# Patient Record
Sex: FEMALE | Race: WHITE | Employment: STUDENT | URBAN - METROPOLITAN AREA
[De-identification: names, ages, dates, MRNs, and addresses within clinical notes are randomized per-mention and may not be internally consistent; named-entity substitution may affect disease eponyms.]

---

## 2017-04-17 ENCOUNTER — HOSPITAL ENCOUNTER (EMERGENCY)
Facility: HOSPITAL | Age: 22
Discharge: HOME | End: 2017-04-18
Attending: EMERGENCY MEDICINE
Payer: COMMERCIAL

## 2017-04-17 DIAGNOSIS — M54.50 BACK PAIN, LUMBOSACRAL: Primary | ICD-10-CM

## 2017-04-17 PROCEDURE — 96374 THER/PROPH/DIAG INJ IV PUSH: CPT

## 2017-04-17 PROCEDURE — 99284 EMERGENCY DEPT VISIT MOD MDM: CPT

## 2017-04-17 PROCEDURE — 96376 TX/PRO/DX INJ SAME DRUG ADON: CPT

## 2017-04-17 PROCEDURE — 96375 TX/PRO/DX INJ NEW DRUG ADDON: CPT

## 2017-04-17 PROCEDURE — 81025 URINE PREGNANCY TEST: CPT

## 2017-04-18 VITALS
WEIGHT: 200 LBS | HEART RATE: 49 BPM | RESPIRATION RATE: 16 BRPM | OXYGEN SATURATION: 97 % | DIASTOLIC BLOOD PRESSURE: 69 MMHG | SYSTOLIC BLOOD PRESSURE: 127 MMHG | HEIGHT: 66 IN | BODY MASS INDEX: 32.14 KG/M2

## 2017-04-18 RX ORDER — METHYLPREDNISOLONE 4 MG/1
TABLET ORAL
Qty: 1 PACKAGE | Refills: 0 | Status: SHIPPED | OUTPATIENT
Start: 2017-04-18 | End: 2017-04-23

## 2017-04-18 RX ORDER — ORPHENADRINE CITRATE 30 MG/ML
60 INJECTION INTRAMUSCULAR; INTRAVENOUS ONCE
Status: COMPLETED | OUTPATIENT
Start: 2017-04-18 | End: 2017-04-18

## 2017-04-18 RX ORDER — KETOROLAC TROMETHAMINE 30 MG/ML
30 INJECTION, SOLUTION INTRAMUSCULAR; INTRAVENOUS ONCE
Status: COMPLETED | OUTPATIENT
Start: 2017-04-18 | End: 2017-04-18

## 2017-04-18 RX ORDER — HYDROMORPHONE HYDROCHLORIDE 1 MG/ML
1 INJECTION, SOLUTION INTRAMUSCULAR; INTRAVENOUS; SUBCUTANEOUS EVERY 30 MIN PRN
Status: DISCONTINUED | OUTPATIENT
Start: 2017-04-18 | End: 2017-04-18

## 2017-04-18 RX ORDER — MELOXICAM 10 MG/1
CAPSULE ORAL
COMMUNITY

## 2017-04-18 RX ORDER — TRAMADOL HYDROCHLORIDE 50 MG/1
TABLET ORAL EVERY 6 HOURS PRN
Qty: 20 TABLET | Refills: 0 | Status: SHIPPED | OUTPATIENT
Start: 2017-04-18 | End: 2017-04-25

## 2017-04-18 RX ORDER — TRAMADOL HYDROCHLORIDE 50 MG/1
TABLET ORAL EVERY 6 HOURS PRN
COMMUNITY

## 2017-04-18 RX ORDER — ORPHENADRINE CITRATE 100 MG/1
100 TABLET, EXTENDED RELEASE ORAL 2 TIMES DAILY
Qty: 10 TABLET | Refills: 0 | Status: SHIPPED | OUTPATIENT
Start: 2017-04-18 | End: 2017-04-25

## 2017-04-18 NOTE — ED PROVIDER NOTES
Patient Seen in: [facility] Emergency Department    History   Patient presents with:  Back Pain (musculoskeletal)    Stated Complaint: back pain    HPI    Patient has a history of bulging disks in her back diagnosed on MRI scan.   She is also had a h family history on file. Smoking Status: Never Smoker                        Review of Systems    Positive for stated complaint: back pain  Other systems are as noted in HPI. Constitutional and vital signs reviewed.       All other systems reviewed and much benefit. Patient treated with Toradol, Norflex, and Dilaudid for pain    On repeat examination, she appeared more comfortable. Her father arrived. I reviewed her presentation and treatment.   At this point, I believe she may be safely discharged h

## 2017-04-18 NOTE — ED NOTES
Patient still reports back pain 6/10. ER MD notified. Patient's heart rate 40-50's will hold off on another administration of pain medication.

## 2017-04-18 NOTE — ED INITIAL ASSESSMENT (HPI)
Patient sts lower middle back pain started a few hours ago. Denies taking tylenol or motrin PTA. Denies bowel or bladder incontinence.

## 2018-12-28 PROCEDURE — 88175 CYTOPATH C/V AUTO FLUID REDO: CPT | Performed by: NURSE PRACTITIONER

## 2020-10-19 PROBLEM — E66.01 SEVERE OBESITY (HCC): Status: ACTIVE | Noted: 2020-10-19

## 2021-06-29 ENCOUNTER — HOSPITAL ENCOUNTER (OUTPATIENT)
Dept: PHYSICAL THERAPY | Age: 26
Discharge: HOME | End: 2021-06-29
Payer: COMMERCIAL

## 2021-06-29 PROCEDURE — 97110 THERAPEUTIC EXERCISES: CPT

## 2021-06-29 PROCEDURE — 97162 PT EVAL MOD COMPLEX 30 MIN: CPT

## 2021-06-29 NOTE — THERAPY EVALUATION
Beverley Lebron  : 1995  Primary: [facility]  Secondary:  Therapy Center at . [facility], [address]  [phone]   [phone]         OUTPATIENT PHYSICAL THERAPY:Initial Assessment 2021    Treatment Diagnosis: Low back pain (M54.5)  Lumbago with Sciatica Left side (M54.42)  Muscle Weakness, Generalized (M62.81)  Pain in right knee (M25.561)    Precautions/Allergies:   Patient has no known allergies. MD Orders: Eval and Treat MEDICAL/REFERRING DIAGNOSIS:  Pain in right knee [M25.561]   DATE OF ONSET: chronic with recent flare up in April  REFERRING PHYSICIAN: Noel Nesbitt MD  RETURN PHYSICIAN APPOINTMENT: TBD by patient     INITIAL ASSESSMENT:  Ms. Castelan presents to physical therapy with right knee and low back pain that gradual flared up starting in 2021. Since flare up pt has limited her activity and normal wellness program at [employer]. Pt has a complex medical history as she has juvenile RA, discectomy in 2017, and a more recent R wrist surgery to remove ganglion cyst. Pt vocalizes that she would like to return to her regular community based wellness program, however she is fearful of what and how to exercise safely. Patient will benefit from skilled physical therapy for manual therapeutic techniques (as appropriate), therapeutic exercises and activities, balance and comprehensive home exercises program to address current impairments and functional limitations. Beverley Lebron will benefit from skilled PT (medically necessary) in order to address above deficits affecting participation in basic ADLs and overall functional tolerance. PROBLEM LIST (Impacting functional limitations):   1. Decreased Strength  2. Decreased ADL/Functional Activities  3. Decreased Transfer Abilities  4. Decreased Ambulation Ability/Technique  5. Decreased Balance  6.  Increased Pain  7. Decreased Joint mobility/Flexibility   8. Decreased Activity Tolerance  9. Decreased [place] with Home Exercise Program INTERVENTIONS PLANNED:   1. Balance Exercise  2. Bed Mobility  3. Cold  4. Cryotherapy  5. Family Education  6. Gait Training  7. Heat  8. Home Exercise Program (HEP)  9. Manual Therapy  10. Neuromuscular Re-education/Strengthening  11. Range of Motion (ROM)  12. Therapeutic Activites  13. Therapeutic Exercise/Strengthening  14. Transfer Training  15. Ultrasound (US)  16. Vasopneumatic Compression          TREATMENT PLAN:  Effective Dates: 6/29/2021 TO 8/28/2021 (60 days). Frequency/Duration: 2 times a week for 60 Days    GOALS: (Goals have been discussed and agreed upon with patient.)   Short-Term Goals: 30 days  Goal Met   1Siri Kang will be independent with HEP to maintain functional gains made with therapy intervention. 1.  [] Date:   2. Leonid Kang will be able to complete single leg squat x 1 min with no more than 2 instances of knee valgus to prepare for return to running. 2.  [] Date:   3. Leonid Kang will return to gym to complete machine based exercises at least 2x/ week to assist with safe progression to free weights. 3.  [] Date:   4. Leonid Kang will be able to perform 20 sit to stand transfers in 30 seconds from standard height chair without UE support in order to improve strength and community access at restaurants 4. [] Date:    Long Term Goals: 60 days Goal Met   1. Leonid Kang to increase lower extremity functional scale by 20 points to show improvement in household and community mobility. 1.  [] Date:   2. Leonid Kang will be able to carry 20 lbs in bilateral UE in order to complete household chores, community participation, and work needs. 2.  [] Date:   3.  Leonid Kang will be able to lift 100 lb from floor in order to complete household chores and improve overall strength 3.  [] Date:   4. Lenin Tucker will be able to complete a 10 min walk/jog program to return to pt desire to return to running at gym 4. [] Date:   5. Lenin Tucker will increase  strength to 60lb on the right to indicated improved overall strength 5.  [] Date:        strength L 52 lb, right 50 lb  Outcome Measure: Tool Used: Lower Extremity Functional Scale (LEFS)  Score:  Initial: 50/80 Most Recent: X/80 (Date: -- )   Interpretation of Score: 20 questions each scored on a 5 point scale with 0 representing \"extreme difficulty or unable to perform\" and 4 representing \"no difficulty\". The lower the score, the greater the functional disability. 80/80 represents no disability. Minimal detectable change is 9 points. Tool Used: 30 sec Sit to Stand  Score:  Initial: 12 reps Most Recent: X reps (Date: -- )       Medical Necessity:   · Skilled intervention continues to be required due to current impairment. Reason for Services/Other Comments:  · Patient continues to require skilled intervention due to patient continues to present with impairments assessed at initial evaluation and requiring skilled physical therapy to meet goals for PT. Rehabilitation Potential For Stated Goals: Excellent    Total Treatment Duration: 30 min plus treatment  PT Patient Time In/Time Out  Time In: 0820  Time Out: 1961    Regarding [facility]'s therapy, I certify that the treatment plan above will be carried out by a therapist or under their direction. Thank you for this referral,  Harris Farah PT     Referring Physician Signature: Venita Gosselin, MD              Date                  HISTORY:   History of Injury/Illness (Reason for Referral):  Approximately 2 months ago had gradual onset of knee pain. Pt reports aggravating instance was wearing heels and walking around. Pt reports that she has had chronic LBP.  Pt had surgery in 2017 due to ruptured L4/L5 disc. Pt reports that she use to like running, she has not been running in a long time. Pt reports she is hit and miss with a work out routine and that comes and goes. pt reports poor tolerance to heavy household chores - flares up back pain. Pt reports she has had numbness in her left before her surgery in 2017. 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 Karla Jalloh  has a past medical history of Rheumatoid arthritis ([facility] [address] Ms. Karla Jalloh  has a past surgical history that includes hx back surgery (2017); hx appendectomy; and hx tonsil and adenoidectomy (2006).     Current Medications:    Current Outpatient Medications:     naproxen sodium (Aleve) 220 mg cap, 1 tablet with food or milk as needed, Disp: , Rfl:     adalimumab (Humira,CF, Pen) 40 mg/0.4 mL injection pen, 0.4 ml, Disp: , Rfl:     FLUoxetine (PROzac) 20 mg capsule, , Disp: , Rfl:     hydrOXYchloroQUINE (Plaquenil) 200 mg tablet, 2 tabs, Disp: , Rfl:     leucovorin calcium (WELLCOVORIN) 10 mg tablet, , Disp: , Rfl:     L-Norgest&E Estradiol-E Estrad (Seasonique) 0.15 mg-30 mcg (84)/10 mcg (7) 3MPk, Take 1 Tab by mouth daily. , Disp: 1 Package, Rfl: 3      Ambulatory/Rehab Services H2 Model Falls Risk Assessment    Risk Factors:       (1)  Any administered benzodiazepines Ability to Rise from Chair:       (0)  Ability to rise in a single movement    Falls Prevention Plan:       No modifications necessary   Total: (5 or greater = High Risk): 1    ©2010 [facility] [employer]. All Rights Reserved. [facility] Patent #[id number].  Federal Law prohibits the replication, distribution or use without written permission from [facility] [employer]         Date Last Reviewed:  6/29/2021   Number of Personal Factors/Comorbidities that affect the Plan of Care: 1-2: MODERATE COMPLEXITY   EXAMINATION:   Observation/Orthostatic Postural Assessment:   Gait:  No significant deviations noted, pt guarded with movement    Functional Mobility: Limited tolerance of walking and standing, unable to jog as she was previously able to   Sit to Stand= increased R knee valgus with repitition  Squat= unabel to complete full functional squat to allow ichial tuberosities just below parallel - pt attains 50 % of range, shifts weight to forefoot    Lumbar ROM (Qualitative)    Movement Range Descriptor   Flexion Hands to Shin tightness   Extension Spine of Scapula to midline tightness   Left Rotation Shoulder to midline tightness   Right Rotation Shoulder to midline tightness              AROM/PROM         Joint: Eval Date: 6/29/21  Re-Assess Date:  Re-Assess Date:    Active ROM RIGHT LEFT RIGHT LEFT RIGHT LEFT   Knee Extension 0 0       Knee Flexion 115 115       Hip Flexion 115 115       Hip extension 10 10       Hip IR/ER NT NT         Strength:     Eval Date: 6/29/21  Re-Assess Date:  Re-Assess Date:      RIGHT LEFT RIGHT LEFT RIGHT LEFT   Knee Flexion 4+/5 4+/5       Knee Extension 5/5 5/5       Hip Flexion 5/5 5/5       Hip Abduction 4+/5 4+/5 Hip Extension 4+/5 4+/5       Hip External Rotation 4+/5 4+/5       Dorsiflexion 5/5 5/5           Neurological Screen:  No radiating symptoms currently, however pt reports that she will occasionally get radicular symptoms into the left foot when back flares up      Body Structures Involved:  1. Bones  2. Joints  3. Muscles  4. Ligaments Body Functions Affected:  1. Sensory/Pain  2. Neuromusculoskeletal  3. Movement Related Activities and Participation Affected:  1. Mobility  2. Self Care   Number of elements that affect the Plan of Care: 3: MODERATE COMPLEXITY   CLINICAL PRESENTATION:   Presentation: Evolving clinical presentation with changing clinical characteristics: MODERATE COMPLEXITY   CLINICAL DECISION MAKING:      Use of outcome tool(s) and clinical judgement create a POC that gives a: Questionable prediction of patient's progress: MODERATE COMPLEXITY   See treatment note for associated treatment provided today.

## 2021-06-29 NOTE — PROGRESS NOTES
Carol Hong Olyama  : 1995  Payor: Risa Johnson / Plan: [facility] / Product Type: PPO /  [address] at [address], [place], [address] [place] [address] Road  [phone]   [phone]          OUTPATIENT PHYSICAL THERAPY: Daily Treatment Note 2021 Visit Count:  1    Treatment Diagnosis: Low back pain (M54.5)  Lumbago with Sciatica Left side (M54.42)  Muscle Weakness, Generalized (M62.81)  Pain in right knee (M25.561)    Precautions/Allergies:   Patient has no known allergies. MD Orders: Eval and Treat MEDICAL/REFERRING DIAGNOSIS:  Pain in right knee [M25.561]   DATE OF ONSET: chronic with recent flare up in April  REFERRING PHYSICIAN: Cherelle Gong MD  RETURN PHYSICIAN APPOINTMENT: TBD by patient       Pre-treatment Symptoms/Complaints: See Initial Eval Dated 21 for more details. Pain: Initial:6/10  Medications Last Reviewed:  2021     Post Session: 6/10   Updated Objective Findings: See Initial Eval for more details. TREATMENT:   THERAPEUTIC EXERCISE: (20 minutes):  Exercises per grid below to improve mobility, strength and balance. Required minimal visual, verbal and manual cues to promote proper body alignment and promote proper body posture. Progressed resistance and complexity of movement as indicated. Date:  2021 Date:   Date:     Activity/Exercise Parameters Parameters Parameters   Education HEP, POC, PT goals, anatomy/pathology     Cat/camel 10x     child's pose 2 min     L stretch 3 min     Open book Left/right  10 x     Functional squat 5x                 THERAPEUTIC ACTIVITY: ( 0 minutes): Activities per gid below to improve functional movement related mobility, strength and balance to improve neuro-muscular carryover to daily functional activities for improving patient's quality of life.  Required visual, verbal and manual cues to promote proper body alignment and promote proper body posture/mechanics. Progressed resistance and complexity of movement as indicated. Date:  6/29/2021 Date:   Date:     Activity/Exercise Parameters Parameters Parameters                                                                               MANUAL THERAPY: (0 minutes): Joint mobilization, Soft tissue mobilization was utilized and necessary because of the patient's restricted joint motion and restricted motion of soft tissue mobility. Date  6/29/2021    Technique Used Grade  Level # Time(s) Effect while being performed                                                                 HEP Log Date 1. Open book, cat/camel, kia pose, L stretch, functional squat 6/29/2021   2.  6/29/2021   3. 6/29/2021   4.    5.           [employer] Portal  Treatment/Session Summary:    Response to Treatment: Pt demonstrated understanding of POC and initial HEP. No increase in pain or adverse reactions. Communication/Consultation:  POC, HEP, PT goals, Faxed initial evaluation to MD.   Equipment provided today: HEP Handout   Recommendations/Intent for next treatment session:   Next visit will focus on Core Stability Pain Science Education Quad strengthening Hip strengthening RTC strengthening soft tissue mobilization progressive resistance training. Treatment Plan of Care Effective Dates: 6/29/2021 TO 8/28/2021 (60 days).   Frequency/Duration: 2 times a week for 60 Days       Total Treatment Billable Duration:   20  Rx plus Eval   PT Patient Time In/Time Out  Time In: 0820  Time Out: [address], PT    Future Appointments   Date Time Provider [place]   7/1/2021  7:30 AM Milan Grove, PT [facility]   7/7/2021  7:30 AM [place]   7/9/2021  7:30 AM Milan Grove, PT [facility]   7/14/2021  7:30 AM Milan Grove, PT [facility]   7/16/2021  7:30 AM Milan Grove, PT [facility]

## 2021-07-01 ENCOUNTER — HOSPITAL ENCOUNTER (OUTPATIENT)
Dept: PHYSICAL THERAPY | Age: 26
Discharge: HOME | End: 2021-07-01
Payer: COMMERCIAL

## 2021-07-01 PROCEDURE — 97530 THERAPEUTIC ACTIVITIES: CPT

## 2021-07-01 PROCEDURE — 97110 THERAPEUTIC EXERCISES: CPT

## 2021-07-01 NOTE — PROGRESS NOTES
Dusty Castelan  : 1995  Payor: Cherry Hatchet / Plan: [facility] / Product Type: PPO /  [address] [facility] at [address] [place] [address]. [facility], [address], [facility], [address] [place] [address]  [phone]   [phone]          OUTPATIENT PHYSICAL THERAPY: Daily Treatment Note 2021 Visit Count:  2    Treatment Diagnosis: Low back pain (M54.5)  Lumbago with Sciatica Left side (M54.42)  Muscle Weakness, Generalized (M62.81)  Pain in right knee (M25.561)    Precautions/Allergies:   Patient has no known allergies. MD Orders: Eval and Treat MEDICAL/REFERRING DIAGNOSIS:  Pain in right knee [M25.561]   DATE OF ONSET: chronic with recent flare up in April  REFERRING PHYSICIAN: Jericho Frazier MD  RETURN PHYSICIAN APPOINTMENT: TBD by patient       Pre-treatment Symptoms/Complaints: \"I am feeling a little bit better. \"   Pain: Initial:6/10  Medications Last Reviewed:  2021     Post Session: 6/10   Updated Objective Findings: None today        TREATMENT:   THERAPEUTIC EXERCISE: (25 minutes):  Exercises per grid below to improve mobility, strength and balance. Required minimal visual, verbal and manual cues to promote proper body alignment and promote proper body posture. Progressed resistance and complexity of movement as indicated. Date:  2021 Date:  21 Date:     Activity/Exercise Parameters Parameters Parameters   Education HEP, POC, PT goals, anatomy/pathology     Cat/camel 10x     child's pose  2 min To scorpion through quad rocking  2 min    L stretch 3 min 2 min    Open book Left/right  10 x     Functional squat 5x     Airdyne Bike  6 min   44 calories    LTR  2 min    Scorpion KIck  2 min    Banded face pull aparts  Red band  2 x 8 reps          THERAPEUTIC ACTIVITY: ( 20 minutes):  Activities per gid below to improve functional movement related mobility, strength and balance to improve neuro-muscular carryover to daily functional activities for improving patient's quality of life. Required visual, verbal and manual cues to promote proper body alignment and promote proper body posture/mechanics. Progressed resistance and complexity of movement as indicated. Date:  7/1/2021 Date:   Date:     Activity/Exercise Parameters Parameters Parameters   Sidesteps Red band  2 x 5 fot  Left/right       Monster walks Red band  2 x 50 ft       Box Squat 16 in, red band  15 lb  3 x 8 reps       Dead lift 45 lb  2 x 5 reps                                           MANUAL THERAPY: (0 minutes): Joint mobilization, Soft tissue mobilization was utilized and necessary because of the patient's restricted joint motion and restricted motion of soft tissue mobility. Date  7/1/2021    Technique Used Grade  Level # Time(s) Effect while being performed                                                                 HEP Log Date 1. Open book, cat/camel, kia pose, L stretch, functional squat 6/29/2021   2.  7/1/2021   3. 7/1/2021   4.    5.           [employer] Portal  Treatment/Session Summary:    Response to Treatment: Pt responds well to use of bands for corrective exercises. Pt requires intermittent cues to increase scapular retraction and spinal rigidity. Pt to benefit from continued hip and and scap stabilizer strengthening to improve technique for lifting   Communication/Consultation:  None today   Equipment provided today: None today   Recommendations/Intent for next treatment session:   Next visit will focus on Core Stability Pain Science Education Quad strengthening Hip strengthening RTC strengthening soft tissue mobilization progressive resistance training. Treatment Plan of Care Effective Dates: 6/29/2021 TO 8/28/2021 (60 days).   Frequency/Duration: 2 times a week for 60 Days       Total Treatment Billable Duration:   45  Rx   PT Patient Time In/Time Out  Time In: 0730  Time Out: 0815  Sharri Jackson PT    Future Appointments Date Time Provider [place]   7/7/2021  7:30 AM Carlos Monterroso, PT [facility]   7/9/2021  7:30 AM [facility]   7/14/2021  7:30 AM Carlos Monterroso PT [facility]   7/16/2021  7:30 AM BONNIE Wray [facility]

## 2021-07-07 ENCOUNTER — HOSPITAL ENCOUNTER (OUTPATIENT)
Dept: PHYSICAL THERAPY | Age: 26
Discharge: HOME | End: 2021-07-07
Payer: COMMERCIAL

## 2021-07-07 PROCEDURE — 97530 THERAPEUTIC ACTIVITIES: CPT

## 2021-07-07 PROCEDURE — 97110 THERAPEUTIC EXERCISES: CPT

## 2021-07-07 NOTE — PROGRESS NOTES
Arik Castelan  : 1995  Payor: Enedina Rodriguez / Plan: [facility] / Product Type: PPO /  [address] at [address] [facility], [address], [place], [address] [place] [address] Road  [phone]   [phone]          OUTPATIENT PHYSICAL THERAPY: Daily Treatment Note 2021 Visit Count:  3    Treatment Diagnosis: Low back pain (M54.5)  Lumbago with Sciatica Left side (M54.42)  Muscle Weakness, Generalized (M62.81)  Pain in right knee (M25.561)    Precautions/Allergies:   Patient has no known allergies. MD Orders: Eval and Treat MEDICAL/REFERRING DIAGNOSIS:  Pain in right knee [M25.561]   DATE OF ONSET: chronic with recent flare up in April  REFERRING PHYSICIAN: Noel Nesbitt MD  RETURN PHYSICIAN APPOINTMENT: TBD by patient       Pre-treatment Symptoms/Complaints: \"I am feeling a little bit better. \"   Pain: Initial:6/10  Medications Last Reviewed:  2021     Post Session: 6/10   Updated Objective Findings: None today        TREATMENT:   THERAPEUTIC EXERCISE: ( 8 minutes):  Exercises per grid below to improve mobility, strength and balance. Required minimal visual, verbal and manual cues to promote proper body alignment and promote proper body posture. Progressed resistance and complexity of movement as indicated. Date:  2021 Date:  21 Date:  21   Activity/Exercise Parameters Parameters Parameters   Education HEP, POC, PT goals, anatomy/pathology  Therapist educates on dead lift technique and breathing pattern   Cat/camel 10x     child's pose  2 min To scorpion through quad rocking  2 min    L stretch 3 min 2 min    Open book Left/right  10 x     Functional squat 5x     Airdyne Bike  6 min   44 calories 6 min   44 calories   LTR  2 min    Scorpion KIck  2 min    Banded face pull aparts  Red band  2 x 8 reps          THERAPEUTIC ACTIVITY: ( 32 minutes):  Activities per gid below to improve functional movement related mobility, strength and balance to improve neuro-muscular carryover to daily functional activities for improving patient's quality of life. Required visual, verbal and manual cues to promote proper body alignment and promote proper body posture/mechanics. Progressed resistance and complexity of movement as indicated. Date:  7/1/2021 Date:  7/7/21 Date:     Activity/Exercise Parameters Parameters Parameters   Sidesteps Red band  2 x 5 fot  Left/right Orange band  2 x 15 ft [employer] walks Red band  2 x 50 ft Orange band  2 x 15 ft     Box Squat 16 in, red band  15 lb  3 x 8 reps       Dead lift 45 lb  2 x 5 reps 45 lb x 5 reps  55 lb x 5 rep  65 lb  3 x 5 reps     Palloff Press   Black band  3 x 5 reps  Left/right     Babcock Carry   15 lb  4 laps     Hip Thruster   20 lb  3 x 10 reps           MANUAL THERAPY: (0 minutes): Joint mobilization, Soft tissue mobilization was utilized and necessary because of the patient's restricted joint motion and restricted motion of soft tissue mobility. Date  7/7/2021    Technique Used Grade  Level # Time(s) Effect while being performed                                                                 HEP Log Date 1. Open book, cat/camel, kia pose, L stretch, functional squat 6/29/2021   2.  7/7/2021   3. 7/7/2021   4.    5.           [employer] Portal  Treatment/Session Summary:    Response to Treatment: Pt tanisha to carry over good dead lift technique from previous session. Intermittent cues for bar path as decreased ability to sustain lat control with descent by 2nd and 3rd sets. Pt to go over squat technique next visit. Pt with no reported back and knee pain throughout session.     Communication/Consultation:  None today   Equipment provided today: None today   Recommendations/Intent for next treatment session:   Next visit will focus on Core Stability Pain Science Education Quad strengthening Hip strengthening RTC strengthening soft tissue mobilization progressive resistance training. Treatment Plan of Care Effective Dates: 6/29/2021 TO 8/28/2021 (60 days).   Frequency/Duration: 2 times a week for 60 Days       Total Treatment Billable Duration:   40  Rx , 5 min un-timed due to rest  PT Patient Time In/Time Out  Time In: 0730  Time Out: 0815  Chelo Gonzalez PT    Future Appointments   Date Time Provider [place]   7/9/2021  7:30 AM Darlene Bowers, PT [facility] AND [facility]   7/14/2021  7:30 AM Darlene Bowers, PT [facility]   7/16/2021  7:30 AM Darlene Bowers, PT [facility]

## 2021-07-09 ENCOUNTER — HOSPITAL ENCOUNTER (OUTPATIENT)
Dept: PHYSICAL THERAPY | Age: 26
Discharge: HOME | End: 2021-07-09
Payer: COMMERCIAL

## 2021-07-09 PROCEDURE — 97110 THERAPEUTIC EXERCISES: CPT

## 2021-07-09 PROCEDURE — 97530 THERAPEUTIC ACTIVITIES: CPT

## 2021-07-09 NOTE — PROGRESS NOTES
Macy Castelan  : 1995  Payor: Tonya Calix / Plan: [facility] / Product Type: PPO /  [address] [facility] at [address] [place] [address]. [facility], [address], [facility], [address] [place] [address]  [phone]   [phone]          OUTPATIENT PHYSICAL THERAPY: Daily Treatment Note 2021 Visit Count:  4    Treatment Diagnosis: Low back pain (M54.5)  Lumbago with Sciatica Left side (M54.42)  Muscle Weakness, Generalized (M62.81)  Pain in right knee (M25.561)    Precautions/Allergies:   Patient has no known allergies. MD Orders: Eval and Treat MEDICAL/REFERRING DIAGNOSIS:  Pain in right knee [M25.561]   DATE OF ONSET: chronic with recent flare up in April  REFERRING PHYSICIAN: Paola Rosa MD  RETURN PHYSICIAN APPOINTMENT: TBD by patient       Pre-treatment Symptoms/Complaints: \"I have not had any issues with my knees. \"   Pain: Initial:6/10  Medications Last Reviewed:  2021     Post Session: 6/10   Updated Objective Findings: None today        TREATMENT:   THERAPEUTIC EXERCISE: ( 8 minutes):  Exercises per grid below to improve mobility, strength and balance. Required minimal visual, verbal and manual cues to promote proper body alignment and promote proper body posture. Progressed resistance and complexity of movement as indicated.      Date:  2021 Date:  21 Date:  21 Date:  21   Activity/Exercise Parameters Parameters Parameters    Education HEP, POC, PT goals, anatomy/pathology  Therapist educates on dead lift technique and breathing pattern    Cat/camel 10x      child's pose  2 min To scorpion through quad rocking  2 min     L stretch 3 min 2 min  2 min   Open book Left/right  10 x      Functional squat 5x      Airdyne Bike  6 min   44 calories 6 min   44 calories    LTR  2 min     Scorpion KIck  2 min     Banded face pull aparts  Red band  2 x 8 reps  Red band  2 x 8 reps   Treadmill    Walk/jog 1:1  6 min   90/90 dowel stretch    15 x THERAPEUTIC ACTIVITY: ( 32 minutes): Activities per gid below to improve functional movement related mobility, strength and balance to improve neuro-muscular carryover to daily functional activities for improving patient's quality of life. Required visual, verbal and manual cues to promote proper body alignment and promote proper body posture/mechanics. Progressed resistance and complexity of movement as indicated. Date:  7/1/2021 Date:  7/7/21 Date:  7/9/21   Activity/Exercise Parameters Parameters Parameters   Sidesteps Red band  2 x 5 fot  Left/right Orange band  2 x 15 ft Purple Band  2 x 30 ft   Monster walks Red band  2 x 50 ft Orange band  2 x 15 ft Purple Band  2 x 30 ft   Box Squat 16 in, red band  15 lb  3 x 8 reps       Dead lift 45 lb  2 x 5 reps 45 lb x 5 reps  55 lb x 5 rep  65 lb  3 x 5 reps     Palloff Press   Black band  3 x 5 reps  Left/right     Babcock Carry   15 lb  4 laps 15lb/20 lb  4 laps   Hip Thruster   20 lb  3 x 10 reps     Low bar squat    45 lb x 8 reps  55 lb x 5 reps  65 lb  3 x 5 reps   Front plank    2 x 30 sec   Side plank    Left/right  2 x 30 sec each         MANUAL THERAPY: (0 minutes): Joint mobilization, Soft tissue mobilization was utilized and necessary because of the patient's restricted joint motion and restricted motion of soft tissue mobility. Date  7/9/2021    Technique Used Grade  Level # Time(s) Effect while being performed                                                                 HEP Log Date 1. Open book, cat/camel, kia pose, L stretch, functional squat 6/29/2021   2.  7/9/2021   3. 7/9/2021   4.    5.           [facility] Portal  Treatment/Session Summary:    Response to Treatment: Pt with good squat depth and requires intermittent cues for maintaining bar position and scapular positioning. Pt initiated on treadmill walk/jog with no knee pain noted.  Pt to benefit for asymmetrical weights and single leg strengthening to prepare for pt desire to run for prolonged amount of time    Communication/Consultation:  None today   Equipment provided today: None today   Recommendations/Intent for next treatment session:   Next visit will focus on Core Stability Pain Science Education Quad strengthening Hip strengthening RTC strengthening soft tissue mobilization progressive resistance training. Treatment Plan of Care Effective Dates: 6/29/2021 TO 8/28/2021 (60 days).   Frequency/Duration: 2 times a week for 60 Days       Total Treatment Billable Duration:   40  Rx , 5 min un-timed due to rest  PT Patient Time In/Time Out  Time In: 3048  Time Out: 0820  Sinai Moore, PT    Future Appointments   Date Time Provider [place]   7/14/2021  7:30 AM Edwin Cameron, PT [facility]   7/16/2021  7[facility]

## 2021-07-14 ENCOUNTER — HOSPITAL ENCOUNTER (OUTPATIENT)
Dept: PHYSICAL THERAPY | Age: 26
Discharge: HOME | End: 2021-07-14
Payer: COMMERCIAL

## 2021-07-14 PROCEDURE — 97110 THERAPEUTIC EXERCISES: CPT

## 2021-07-14 PROCEDURE — 97530 THERAPEUTIC ACTIVITIES: CPT

## 2021-07-14 NOTE — PROGRESS NOTES
Silvia Castelan  : 1995  Payor: Marsha Mendosa / Plan: [facility] / Product Type: PPO /  [address] [facility] at [address] [place] [address]. [facility], [address], [facility], [id number] [place] [address]  [phone]   [phone]          OUTPATIENT PHYSICAL THERAPY: Daily Treatment Note 2021 Visit Count:  5    Treatment Diagnosis: Low back pain (M54.5)  Lumbago with Sciatica Left side (M54.42)  Muscle Weakness, Generalized (M62.81)  Pain in right knee (M25.561)    Precautions/Allergies:   Patient has no known allergies. MD Orders: Eval and Treat MEDICAL/REFERRING DIAGNOSIS:  Pain in right knee [M25.561]   DATE OF ONSET: chronic with recent flare up in April  REFERRING PHYSICIAN: Venita Gosselin, MD  RETURN PHYSICIAN APPOINTMENT: TBD by patient       Pre-treatment Symptoms/Complaints: \"I did okay last time with the walk jog, no issues. \"   Pain: Initial:6/10  Medications Last Reviewed:  2021     Post Session: 6/10   Updated Objective Findings: None today        TREATMENT:   THERAPEUTIC EXERCISE: ( 8 minutes):  Exercises per grid below to improve mobility, strength and balance. Required minimal visual, verbal and manual cues to promote proper body alignment and promote proper body posture. Progressed resistance and complexity of movement as indicated.      Date:  2021 Date:  21 Date:  21 Date:  21 Date:  21   Activity/Exercise Parameters Parameters Parameters     Education HEP, POC, PT goals, anatomy/pathology  Therapist educates on dead lift technique and breathing pattern     Cat/camel 10x       child's pose  2 min To scorpion through quad rocking  2 min      L stretch 3 min 2 min  2 min    Open book Left/right  10 x       Functional squat 5x       Airdyne Bike  6 min   44 calories 6 min   44 calories     LTR  2 min      Scorpion KIck  2 min      Banded face pull aparts  Red band  2 x 8 reps  Red band  2 x 8 reps Red band  2 x 8 reps   Treadmill Walk/jog 1:1  6 min Walk/jog 1:1  6 min   90/90 dowel stretch    15 x          THERAPEUTIC ACTIVITY: ( 32 minutes): Activities per gid below to improve functional movement related mobility, strength and balance to improve neuro-muscular carryover to daily functional activities for improving patient's quality of life. Required visual, verbal and manual cues to promote proper body alignment and promote proper body posture/mechanics. Progressed resistance and complexity of movement as indicated. Date:  7/1/2021 Date:  7/7/21 Date:  7/9/21 Date:  7/14/21   Activity/Exercise Parameters Parameters Parameters    Sidesteps Red band  2 x 5 fot  Left/right Orange band  2 x 15 ft Purple Band  2 x 30 ft Purple Band  2 x 30 ft   Monster walks Red band  2 x 50 ft Orange band  2 x 15 ft Purple Band  2 x 30 ft Purple Band  2 x 30 ft   Box Squat 16 in, red band  15 lb  3 x 8 reps         Dead lift 45 lb  2 x 5 reps 45 lb x 5 reps  55 lb x 5 rep  65 lb  3 x 5 reps   45 lb x 8 reps  70 lb x 5 reps  75 lb  3 x 5 reps   Palloff Press   Black band  3 x 5 reps  Left/right       Babcock Carry   15 lb  4 laps 15lb/20 lb  4 laps 20 lb  4 laps   Hip Thruster   20 lb  3 x 10 reps       Low bar squat    45 lb x 8 reps  55 lb x 5 reps  65 lb  3 x 5 reps    Front plank    2 x 30 sec    Side plank    Left/right  2 x 30 sec each    [place] split squat     3 x 8 reps  Left/right   Good Mornings     30 lb   3 x 8 reps         MANUAL THERAPY: (0 minutes): Joint mobilization, Soft tissue mobilization was utilized and necessary because of the patient's restricted joint motion and restricted motion of soft tissue mobility. Date  7/14/2021    Technique Used Grade  Level # Time(s) Effect while being performed                                                                 HEP Log Date 1.    Open book, cat/camel, kia pose, L stretch, functional squat 6/29/2021   2.  7/14/2021   3. 7/14/2021   4.    5.           [employer] Portal  Treatment/Session Summary:    Response to Treatment: Pt to benefit from continued progression to single leg exercises. Pt with improved foot contact and control in LE chain with shoes doffed. Pt demos carry over of dead lift technique and a vertical bar path. Communication/Consultation:  None today   Equipment provided today: None today   Recommendations/Intent for next treatment session:   Next visit will focus on Core Stability Pain Science Education Quad strengthening Hip strengthening RTC strengthening soft tissue mobilization progressive resistance training. Treatment Plan of Care Effective Dates: 6/29/2021 TO 8/28/2021 (60 days).   Frequency/Duration: 2 times a week for 60 Days       Total Treatment Billable Duration:   40  Rx , 5 min un-timed due to rest  PT Patient Time In/Time Out  Time In: 0730  Time Out: 0815  Katharine Obrien PT    Future Appointments   Date Time Provider [place]   7/16/2021  7:30 AM Joann Whitmore PT [facility] AND [facility]

## 2021-07-16 ENCOUNTER — APPOINTMENT (OUTPATIENT)
Dept: PHYSICAL THERAPY | Age: 26
End: 2021-07-16
Payer: COMMERCIAL

## 2021-07-21 ENCOUNTER — APPOINTMENT (OUTPATIENT)
Dept: PHYSICAL THERAPY | Age: 26
End: 2021-07-21
Payer: COMMERCIAL

## 2021-07-23 ENCOUNTER — APPOINTMENT (OUTPATIENT)
Dept: PHYSICAL THERAPY | Age: 26
End: 2021-07-23
Payer: COMMERCIAL

## 2021-07-28 ENCOUNTER — HOSPITAL ENCOUNTER (OUTPATIENT)
Dept: PHYSICAL THERAPY | Age: 26
Discharge: HOME | End: 2021-07-28
Payer: COMMERCIAL

## 2021-07-28 PROCEDURE — 97530 THERAPEUTIC ACTIVITIES: CPT

## 2021-07-28 PROCEDURE — 97110 THERAPEUTIC EXERCISES: CPT

## 2021-07-28 NOTE — PROGRESS NOTES
Loy Castelan  : 1995  Payor: Iam Vazquez / Plan: [facility] / Product Type: PPO /  [address] [facility] at [address] [place] [address]. [facility], [address], [facility], [address] [place] [address]  [phone]   [phone]          OUTPATIENT PHYSICAL THERAPY: Daily Treatment Note 2021 Visit Count:  6    Treatment Diagnosis: Low back pain (M54.5)  Lumbago with Sciatica Left side (M54.42)  Muscle Weakness, Generalized (M62.81)  Pain in right knee (M25.561)    Precautions/Allergies:   Patient has no known allergies. MD Orders: Eval and Treat MEDICAL/REFERRING DIAGNOSIS:  Pain in right knee [M25.561]   DATE OF ONSET: chronic with recent flare up in April  REFERRING PHYSICIAN: Stalin Valles MD  RETURN PHYSICIAN APPOINTMENT: TBD by patient       Pre-treatment Symptoms/Complaints: No issues with nerve pain going down leg since started therapy. Pain: Initial:6/10  Medications Last Reviewed:  2021     Post Session: 6/10   Updated Objective Findings: None today        TREATMENT:   THERAPEUTIC EXERCISE: ( 12 minutes):  Exercises per grid below to improve mobility, strength and balance. Required minimal visual, verbal and manual cues to promote proper body alignment and promote proper body posture. Progressed resistance and complexity of movement as indicated.      Date:  21 Date:  21 Date:  21 Date:  21 Date  2021   Activity/Exercise Parameters Parameters      Education  Therapist educates on dead lift technique and breathing pattern      Cat/camel        child's pose  To scorpion through quad rocking  2 min       L stretch 2 min  2 min     Open book        Functional squat        Airdyne Bike 6 min   44 calories 6 min   44 calories      LTR 2 min       Scorpion KIck 2 min       Banded face pull aparts Red band  2 x 8 reps  Red band  2 x 8 reps Red band  2 x 8 reps    Treadmill   Walk/jog 1:1  6 min Walk/jog 1:1  6 min 12 min 2:2   90/90 [facility] stretch   15 x           THERAPEUTIC ACTIVITY: ( 42 minutes): Activities per gid below to improve functional movement related mobility, strength and balance to improve neuro-muscular carryover to daily functional activities for improving patient's quality of life. Required visual, verbal and manual cues to promote proper body alignment and promote proper body posture/mechanics. Progressed resistance and complexity of movement as indicated. Date:  7/1/2021 Date:  7/7/21 Date:  7/9/21 Date:  7/14/21 Date  7/28/2021   Activity/Exercise Parameters Parameters Parameters     Sidesteps Red band  2 x 5 fot  Left/right Orange band  2 x 15 ft Purple Band  2 x 30 ft Purple Band  2 x 30 ft Purple Band  2 x 30 ft   Monster walks Red band  2 x 50 ft Orange band  2 x 15 ft Purple Band  2 x 30 ft Purple Band  2 x 30 ft Purple Band  2 x 30 ft   Box Squat 16 in, red band  15 lb  3 x 8 reps           Dead lift 45 lb  2 x 5 reps 45 lb x 5 reps  55 lb x 5 rep  65 lb  3 x 5 reps   45 lb x 8 reps  70 lb x 5 reps  75 lb  3 x 5 reps 75lbs x5  80lbs x5  85lbs x5   Palloff Press   Black band  3 x 5 reps  Left/right         Babcock Carry   15 lb  4 laps 15lb/20 lb  4 laps 20 lb  4 laps 20 lb  4 laps   Hip Thruster   20 lb  3 x 10 reps         Low bar squat    45 lb x 8 reps  55 lb x 5 reps  65 lb  3 x 5 reps     Front plank    2 x 30 sec     Side plank    Left/right  2 x 30 sec each     [place] split squat     3 x 8 reps  Left/right    Good Mornings     30 lb   3 x 8 reps 30 lb   3 x 8 reps   Circuit Training      2 rounds  5 back squats  15 cals  10 Goblet squats  3 inchworms                               MANUAL THERAPY: (0 minutes): Joint mobilization, Soft tissue mobilization was utilized and necessary because of the patient's restricted joint motion and restricted motion of soft tissue mobility.         Date  7/28/2021    Technique Used Grade Level # Time(s) Effect while being performed HEP Log Date 1. Open book, cat/camel, kia pose, L stretch, functional squat 6/29/2021   2.  7/28/2021   3. 7/28/2021   4.    5.           [employer] Portal  Treatment/Session Summary:      Response to Treatment:  Pt tolerated treatment well and had no subjective complaints    Communication/Consultation:  None today   Equipment provided today: None today   Recommendations/Intent for next treatment session:   Next visit will focus on Core Stability Pain Science Education Quad strengthening Hip strengthening RTC strengthening soft tissue mobilization progressive resistance training. Treatment Plan of Care Effective Dates: 6/29/2021 TO 8/28/2021 (60 days).   Frequency/Duration: 2 times a week for 60 Days       Total Treatment Billable Duration:   54  Rx ,  PT Patient Time In/Time Out  Time In: 0730  Time Out: [address] [facility] Chan Julian, PT    Future Appointments   Date Time Provider [place]   7/30/2021  7:30 AM Keisha Sep, PT [facility] AND [facility]   8/3/2021  7:30 AM Serrano Estimable, PT [facility]   8/5/2021  7:30 AM Serrano Estimable, PT [facility]

## 2021-07-30 ENCOUNTER — HOSPITAL ENCOUNTER (OUTPATIENT)
Dept: PHYSICAL THERAPY | Age: 26
Discharge: HOME | End: 2021-07-30
Payer: COMMERCIAL

## 2021-07-30 PROCEDURE — 97110 THERAPEUTIC EXERCISES: CPT

## 2021-07-30 PROCEDURE — 97530 THERAPEUTIC ACTIVITIES: CPT

## 2021-07-30 NOTE — PROGRESS NOTES
Arik Castelan  : 1995  Payor: Enedina Rodriguez / Plan: [facility] / Product Type: PPO /  [address] [facility], [address], [place], [address] [place] [address] Road  [phone]   [phone]          OUTPATIENT PHYSICAL THERAPY: Daily Treatment Note 2021 Visit Count:  7    Treatment Diagnosis: Low back pain (M54.5)  Lumbago with Sciatica Left side (M54.42)  Muscle Weakness, Generalized (M62.81)  Pain in right knee (M25.561)    Precautions/Allergies:   Patient has no known allergies. MD Orders: Eval and Treat MEDICAL/REFERRING DIAGNOSIS:  Pain in right knee [M25.561]   DATE OF ONSET: chronic with recent flare up in April  REFERRING PHYSICIAN: Noel Nesbitt MD  RETURN PHYSICIAN APPOINTMENT: TBD by patient       Pre-treatment Symptoms/Complaints: Pt reports that she is doing well and has no complaints    Pain: Initial:6/10  Medications Last Reviewed:  2021     Post Session: 0/10   Updated Objective Findings: None today        TREATMENT:   THERAPEUTIC EXERCISE: ( 10 minutes):  Exercises per grid below to improve mobility, strength and balance. Required minimal visual, verbal and manual cues to promote proper body alignment and promote proper body posture. Progressed resistance and complexity of movement as indicated. Date:  21 Date:  21 Date:  21 Date  2021 Date  2021   Activity/Exercise Parameters       Education Therapist educates on dead lift technique and breathing pattern       Cat/camel        child's pose         L stretch  2 min      Open book        Functional squat        Airdyne Bike 6 min   44 calories       LTR        Scorpion KIck        Banded face pull aparts  Red band  2 x 8 reps Red band  2 x 8 reps     Treadmill  Walk/jog 1:1  6 min Walk/jog 1:1  6 min 12 min 2:2 10 min 2-3   90/90 dowel stretch  15 x            THERAPEUTIC ACTIVITY: ( 35 minutes):  Activities per gid below to improve functional movement related mobility, strength and balance to improve neuro-muscular carryover to daily functional activities for improving patient's quality of life. Required visual, verbal and manual cues to promote proper body alignment and promote proper body posture/mechanics. Progressed resistance and complexity of movement as indicated. Date:  7/7/21 Date:  7/9/21 Date:  7/14/21 Date  7/28/2021 Date  7/30/2021   Activity/Exercise Parameters Parameters      Sidesteps Orange band  2 x 15 ft Purple Band  2 x 30 ft Purple Band  2 x 30 ft Purple Band  2 x 30 ft Purple Band  2 x 30 ft   Monster walks Orange band  2 x 15 ft Purple Band  2 x 30 ft Purple Band  2 x 30 ft Purple Band  2 x 30 ft Purple Band  2 x 30 ft   Box Squat             Dead lift 45 lb x 5 reps  55 lb x 5 rep  65 lb  3 x 5 reps   45 lb x 8 reps  70 lb x 5 reps  75 lb  3 x 5 reps 75lbs x5  80lbs x5  85lbs x5 95 lbs 3x5   Palloff Press Black band  3 x 5 reps  Left/right       Green band 30xs each time   [employer] 15 lb  4 laps 15lb/20 lb  4 laps 20 lb  4 laps 20 lb  4 laps 20 lb  4 laps   Hip Thruster 20 lb  3 x 10 reps           Low bar squat  45 lb x 8 reps  55 lb x 5 reps  65 lb  3 x 5 reps   75 lbs 3x5   Front plank  2 x 30 sec      Side plank  Left/right  2 x 30 sec each      L[employer] split squat   3 x 8 reps  Left/right     Good Mornings   30 lb   3 x 8 reps 30 lb   3 x 8 reps 30 lb 3 x 8 reps   Circuit Training    2 rounds  5 back squats  15 cals  10 Goblet squats  3 inchworms     3 rounds  15 cals  10 thrusters  15 Goblet squats  Time: 16:38                         MANUAL THERAPY: (0 minutes): Joint mobilization, Soft tissue mobilization was utilized and necessary because of the patient's restricted joint motion and restricted motion of soft tissue mobility.         Date  7/30/2021    Technique Used Grade Level # Time(s) Effect while being performed                                                                 HEP Log Date 1. Open book, cat/camel, kia pose, L stretch, functional squat 6/29/2021   2.  7/30/2021   3. 7/30/2021   4.    5.           [employer] Portal  Treatment/Session Summary:      Response to Treatment:  Pt tolerated treatment well and no pain with exercises increased loading to knee and back. Patient was able to tolerate progressions to weight training and HIIT programming. Communication/Consultation:  None today   Equipment provided today: None today   Recommendations/Intent for next treatment session:   Next visit will focus on Core Stability Pain Science Education Quad strengthening Hip strengthening RTC strengthening soft tissue mobilization progressive resistance training. Treatment Plan of Care Effective Dates: 6/29/2021 TO 8/28/2021 (60 days).   Frequency/Duration: 2 times a week for 60 Days       Total Treatment Billable Duration:   45  Rx ,  PT Patient Time In/Time Out  Time In: 0730  Time Out: [address] [facility] [place] PT    Future Appointments   Date Time Provider [place]   8/3/2021  7:30 AM Ursula Samson, PT [facility]   8/5/2021  7:30 AM [facility]ula Samson, PT [facility]

## 2021-08-03 ENCOUNTER — HOSPITAL ENCOUNTER (OUTPATIENT)
Dept: PHYSICAL THERAPY | Age: 26
Discharge: HOME | End: 2021-08-03
Payer: COMMERCIAL

## 2021-08-03 PROCEDURE — 97110 THERAPEUTIC EXERCISES: CPT

## 2021-08-03 PROCEDURE — 97530 THERAPEUTIC ACTIVITIES: CPT

## 2021-08-03 NOTE — PROGRESS NOTES
Jet Castelan  : 1995  Payor: Sumaya Fiore / Plan: [facility] / Product Type: PPO /  Javier [address]ler at [address] [facility] [address], [facility], [address] [place] [address]  [phone]   [phone]          OUTPATIENT PHYSICAL THERAPY: Daily Treatment Note 8/3/2021 Visit Count:  8    Treatment Diagnosis: Low back pain (M54.5)  Lumbago with Sciatica Left side (M54.42)  Muscle Weakness, Generalized (M62.81)  Pain in right knee (M25.561)    Precautions/Allergies:   Patient has no known allergies. MD Orders: Eval and Treat MEDICAL/REFERRING DIAGNOSIS:  Pain in right knee [M25.561]   DATE OF ONSET: chronic with recent flare up in April  REFERRING PHYSICIAN: Edgar Pollack MD  RETURN PHYSICIAN APPOINTMENT: TBD by patient       Pre-treatment Symptoms/Complaints: Pt reporting no issues with back and knee pain. Pain: Initial:6/10  Medications Last Reviewed:  8/3/2021     Post Session: 0/10   Updated Objective Findings: None today        TREATMENT:   THERAPEUTIC EXERCISE: ( 12 minutes):  Exercises per grid below to improve mobility, strength and balance. Required minimal visual, verbal and manual cues to promote proper body alignment and promote proper body posture. Progressed resistance and complexity of movement as indicated.      Date:  21 Date:  21 Date:  21 Date  2021 Date  2021 Date:  8/3/21   Activity/Exercise Parameters        Education Therapist educates on dead lift technique and breathing pattern        Cat/camel         child's pose          L stretch  2 min       Open book         Functional squat         Airdyne Bike 6 min   44 calories        LTR         Scorpion KIck         Banded face pull aparts  Red band  2 x 8 reps Red band  2 x 8 reps      Treadmill  Walk/jog 1:1  6 min Walk/jog 1:1  6 min 12 min 2:2 10 min 2-3 Rolling hill function, walk/jog x 12 min   90/90 dowel stretch  15 x             THERAPEUTIC ACTIVITY: ( 33 minutes): Activities per gid below to improve functional movement related mobility, strength and balance to improve neuro-muscular carryover to daily functional activities for improving patient's quality of life. Required visual, verbal and manual cues to promote proper body alignment and promote proper body posture/mechanics. Progressed resistance and complexity of movement as indicated.      Date:  7/7/21 Date:  7/9/21 Date:  7/14/21 Date  7/28/2021 Date  7/30/2021 Date:  8/3/21   Activity/Exercise Parameters Parameters       Sidesteps Orange band  2 x 15 ft Purple Band  2 x 30 ft Purple Band  2 x 30 ft Purple Band  2 x 30 ft Purple Band  2 x 30 ft Purple Band  2 x 30 ft   Monster walks Orange band  2 x 15 ft Purple Band  2 x 30 ft Purple Band  2 x 30 ft Purple Band  2 x 30 ft Purple Band  2 x 30 ft Purple Band  2 x 30 ft   Box Squat               Dead lift 45 lb x 5 reps  55 lb x 5 rep  65 lb  3 x 5 reps   45 lb x 8 reps  70 lb x 5 reps  75 lb  3 x 5 reps 75lbs x5  80lbs x5  85lbs x5 95 lbs 3x5 70 lb x 8 reps  90 lb x 5 reps  100 lb 3 x 5 reps   Palloff Press Black band  3 x 5 reps  Left/right       Green band 30xs each time    [employer] 15 lb  4 laps 15lb/20 lb  4 laps 20 lb  4 laps 20 lb  4 laps 20 lb  4 laps    Hip Thruster 20 lb  3 x 10 reps             Low bar squat  45 lb x 8 reps  55 lb x 5 reps  65 lb  3 x 5 reps   75 lbs 3x5 65 lb x 8 reps  70 lb x 5 rep  80 lb 3 x 5 reps   Front plank  2 x 30 sec       Side plank  Left/right  2 x 30 sec each       [employer] split squat   3 x 8 reps  Left/right      Good Mornings   30 lb   3 x 8 reps 30 lb   3 x 8 reps 30 lb 3 x 8 reps    Circuit Training    2 rounds  5 back squats  15 cals  10 Goblet squats  3 inchworms     3 rounds  15 cals  10 thrusters  15 Goblet squats  Time: 16:38                            MANUAL THERAPY: (0 minutes): Joint mobilization, Soft tissue mobilization was utilized and necessary because of the patient's restricted joint motion and restricted motion of soft tissue mobility. Date  8/3/2021    Technique Used Grade Level # Time(s) Effect while being performed                                                                 HEP Log Date 1. Open book, cat/camel, kia pose, L stretch, functional squat 6/29/2021   2.  8/3/2021   3. 8/3/2021   4.    5.           [employer] Portal  Treatment/Session Summary:      Response to Treatment:  pt with no knee and low back pain with progression to rolling hills function on treadmill. Pt continues to show good carry over of lifting technique  For squat and dead lift. pt anticipated to d/c next visit. Communication/Consultation:  None today   Equipment provided today: None today   Recommendations/Intent for next treatment session:   Next visit will focus on Core Stability Pain Science Education Quad strengthening Hip strengthening RTC strengthening soft tissue mobilization progressive resistance training. Treatment Plan of Care Effective Dates: 6/29/2021 TO 8/28/2021 (60 days).   Frequency/Duration: 2 times a week for 60 Days       Total Treatment Billable Duration:   45  Rx   PT Patient Time In/Time Out  Time In: 0730  Time Out: 0815  Phoebe Recio, PT    Future Appointments   Date Time Provider [place]   8/5/2021  7:30 AM Pinky Archibald, PT [facility]

## 2021-08-05 ENCOUNTER — HOSPITAL ENCOUNTER (OUTPATIENT)
Dept: PHYSICAL THERAPY | Age: 26
Discharge: HOME | End: 2021-08-05
Payer: COMMERCIAL

## 2021-08-05 PROCEDURE — 97110 THERAPEUTIC EXERCISES: CPT

## 2021-08-05 PROCEDURE — 97530 THERAPEUTIC ACTIVITIES: CPT

## 2021-08-05 NOTE — THERAPY DISCHARGE
Jessica Muhammad  : 1995  Primary: [facility]*  Secondary:  [facility] at [facility] [address]  [address], [address]  [phone]   [phone]         OUTPATIENT PHYSICAL THERAPY:Discharge 2021    Treatment Diagnosis: Low back pain (M54.5)  Lumbago with Sciatica Left side (M54.42)  Muscle Weakness, Generalized (M62.81)  Pain in right knee (M25.561)    Precautions/Allergies:   Patient has no known allergies. MD Orders: Eval and Treat MEDICAL/REFERRING DIAGNOSIS:  Pain in right knee [M25.561]   DATE OF ONSET: chronic with recent flare up in April  REFERRING PHYSICIAN: Pola Pacheco MD  RETURN PHYSICIAN APPOINTMENT: TBD by patient     DISCHARGE ASSESSMENT:  Ms. Lopez Console has met 4/4 STGs and 5/5 LTGs over course of therapy intervention focused on improving overall strength, developing plan for safe return to the gym, return to running, and pain science education. Pt reports that she has not had any back pain or knee pain over the past few weeks. Pt demos appropriate technique lifting mechanics and is participating in high intensity training. Pt is able to return community wellness program. Pt no longer requires skilled therapy intervention. PT POC closed. TREATMENT PLAN:  Effective Dates: 2021 TO 2021 (60 days). Frequency/Duration: 2 times a week for 60 Days    GOALS: (Goals have been discussed and agreed upon with patient.)   Short-Term Goals: 30 days  Goal Met   1. Jessica Muhammad will be independent with HEP to maintain functional gains made with therapy intervention. 1.  [x] Date: 21   2. Jessica Muhammad will be able to complete single leg squat x 1 min with no more than 2 instances of knee valgus to prepare for return to running. 2.  [x] Date: 21   3.  Jessica Muhammad will return to gym to complete machine based exercises at least 2x/ week to assist with safe progression to free weights. 3.  [x] Date: 8/5/21   4. Wan Baker will be able to perform 20 sit to stand transfers in 30 seconds from standard height chair without UE support in order to improve strength and community access at restaurants 4. [x] Date: 8/5/21    Long Term Goals: 60 days Goal Met   1. Wan Baker to increase lower extremity functional scale by 20 points to show improvement in household and community mobility. 1.  [x] Date: 8/5/21   2. Wan Baker will be able to carry 20 lbs in bilateral UE in order to complete household chores, community participation, and work needs. 2.  [x] Date: 8/5/21   3. Wan Baker will be able to lift 100 lb from floor in order to complete household chores and improve overall strength 3. [x] Date: 8/3/21   4. Wan Baker will be able to complete a 10 min walk/jog program to return to pt desire to return to running at gym 4. [x] Date: 8/5/21   5. Wan Baker will increase  strength to 60lb on the right to indicated improved overall strength 5. [x] Date: 8/5/21         Outcome Measure: Tool Used: Lower Extremity Functional Scale (LEFS)  Score:  Initial: 50/80 Most Recent: 73/80 (Date: 8/5/21 )   Interpretation of Score: 20 questions each scored on a 5 point scale with 0 representing \"extreme difficulty or unable to perform\" and 4 representing \"no difficulty\". The lower the score, the greater the functional disability. 80/80 represents no disability. Minimal detectable change is 9 points.     Tool Used: 30 sec Sit to Stand  Score:  Initial: 12 reps Most Recent: 28 reps (Date: 8/5/21)      strength L 52 lb, right 50 lb initial, 8/5/21  strength: Left: 64 lb Right 65 lb      Total Treatment Duration: 40 min see treatment note  PT Patient Time In/Time Out  Time In: 0735  Time Out: 0815    Regarding Tomasz Louis's therapy, I certify that the treatment plan above will be carried out by a therapist or under their direction.   Thank you for this referral,  Celso Coker PT     Referring Physician Signature: Sridhar Arteaga MD

## 2021-08-05 NOTE — PROGRESS NOTES
Lina Castelan  : 1995  Payor: Migel Saldana / Plan: [facility] / Product Type: PPO /  [address] [facility], [address], [facility], [address] [place] [address] Road  [phone]   [phone]          OUTPATIENT PHYSICAL THERAPY: Daily Treatment Note 2021 Visit Count:  9    Treatment Diagnosis: Low back pain (M54.5)  Lumbago with Sciatica Left side (M54.42)  Muscle Weakness, Generalized (M62.81)  Pain in right knee (M25.561)    Precautions/Allergies:   Patient has no known allergies. MD Orders: Eval and Treat MEDICAL/REFERRING DIAGNOSIS:  Pain in right knee [M25.561]   DATE OF ONSET: chronic with recent flare up in April  REFERRING PHYSICIAN: Catie Llamas MD  RETURN PHYSICIAN APPOINTMENT: TBD by patient       Pre-treatment Symptoms/Complaints: Pt reporting no issues with back and knee pain. Pain: Initial:6/10  Medications Last Reviewed:  2021     Post Session: 0/10   Updated Objective Findings: See d/c summary        TREATMENT:   THERAPEUTIC EXERCISE: ( 15 minutes):  Exercises per grid below to improve mobility, strength and balance. Required minimal visual, verbal and manual cues to promote proper body alignment and promote proper body posture. Progressed resistance and complexity of movement as indicated.      Date:  21 Date:  21 Date:  21 Date  2021 Date  2021 Date:  8/3/21 Date:  21   Activity/Exercise Parameters         Education Therapist educates on dead lift technique and breathing pattern      Assessment of goals, LEFS, 30 sec sit to stand,  strength, see d/c summary for details   L stretch  2 min        Airdyne Bike 6 min   44 calories         Banded face pull aparts  Red band  2 x 8 reps Red band  2 x 8 reps       Treadmill  Walk/jog 1:1  6 min Walk/jog 1:1  6 min 12 min 2:2 10 min 2-3 Rolling hill function, walk/jog x 12 min Rolling hill function, walk/jog x 8 min   90/90 [facility] stretch  15 x              THERAPEUTIC ACTIVITY: ( 25 minutes): Activities per gid below to improve functional movement related mobility, strength and balance to improve neuro-muscular carryover to daily functional activities for improving patient's quality of life. Required visual, verbal and manual cues to promote proper body alignment and promote proper body posture/mechanics. Progressed resistance and complexity of movement as indicated. Date:  7/7/21 Date:  7/9/21 Date:  7/14/21 Date  7/28/2021 Date  7/30/2021 Date:  8/3/21 Date:  8/5/21   Activity/Exercise Parameters Parameters        Sidesteps Orange band  2 x 15 ft Purple Band  2 x 30 ft Purple Band  2 x 30 ft Purple Band  2 x 30 ft Purple Band  2 x 30 ft Purple Band  2 x 30 ft Purple Band  2 x 30 ft   Monster walks Orange band  2 x 15 ft Purple Band  2 x 30 ft Purple Band  2 x 30 ft Purple Band  2 x 30 ft Purple Band  2 x 30 ft Purple Band  2 x 30 ft Purple Band  2 x 30 ft   Box Squat                 Dead lift 45 lb x 5 reps  55 lb x 5 rep  65 lb  3 x 5 reps   45 lb x 8 reps  70 lb x 5 reps  75 lb  3 x 5 reps 75lbs x5  80lbs x5  85lbs x5 95 lbs 3x5 70 lb x 8 reps  90 lb x 5 reps  100 lb 3 x 5 reps    Palloff Press Black band  3 x 5 reps  Left/right       Green band 30xs each time     [employer] 15 lb  4 laps 15lb/20 lb  4 laps 20 lb  4 laps 20 lb  4 laps 20 lb  4 laps     Hip Thruster 20 lb  3 x 10 reps               Low bar squat  45 lb x 8 reps  55 lb x 5 reps  65 lb  3 x 5 reps   75 lbs 3x5 65 lb x 8 reps  70 lb x 5 rep  80 lb 3 x 5 reps    Front plank  2 x 30 sec        Side plank  Left/right  2 x 30 sec each        [employer] split squat   3 x 8 reps  Left/right       Good Mornings   30 lb   3 x 8 reps 30 lb   3 x 8 reps 30 lb 3 x 8 reps     Circuit Training    2 rounds  5 back squats  15 cals  10 Goblet squats  3 inchworms     3 rounds  15 cals  10 thrusters  15 Goblet squats  Time: 16:38  3 rounds    1. 10 calories on bike   2. [employer] Swings 15 lb, 15 x   3. Thrusters, 15 reps, 10 lb   4. Babcock Carry 25 lb, 1 lap    TIME: 12:47 min                               MANUAL THERAPY: (0 minutes): Joint mobilization, Soft tissue mobilization was utilized and necessary because of the patient's restricted joint motion and restricted motion of soft tissue mobility. Date  8/5/2021    Technique Used Grade Level # Time(s) Effect while being performed                                                                 HEP Log Date 1. Open book, cat/camel, kia pose, L stretch, functional squat 6/29/2021   2.  8/5/2021   3. 8/5/2021   4.    5.           [employer] Portal  Treatment/Session Summary:      Response to Treatment:  pt to d/c today - see summary for details. Communication/Consultation:  None today   Equipment provided today: None today   Recommendations/Intent for next treatment session:   D/c today. Treatment Plan of Care Effective Dates: 6/29/2021 TO 8/28/2021 (60 days). Frequency/Duration: 2 times a week for 60 Days       Total Treatment Billable Duration:   40  Rx   PT Patient Time In/Time Out  Time In: 6563  Time Out: 0815  Hali Heredia PT    No future appointments.

## (undated) NOTE — ED AVS SNAPSHOT
[facility] Emergency Department    [facility]  [address] [address]    Phone:  [phone]    Fax:  [phone]   MRN: [id number]    Department:  [facility] Emergency Department   Date of Visit: IF THERE IS ANY CHANGE OR WORSENING OF YOUR CONDITION, CALL YOUR PRIMARY CARE PHYSICIAN AT ONCE OR RETURN IMMEDIATELY TO THE EMERGENCY DEPARTMENT.     If you have been prescribed any medication(s), please fill your prescription right away and begin taking t

## (undated) NOTE — ED AVS SNAPSHOT
[facility] Emergency Department    [place]  [address] [address]    Phone:  [phone]    Fax:  [address]   MRN: [id number]    Department:  [facility] Emergency Department   Date of Visit: What changed: This medication is already on your medication list.  Do NOT take both doses of the new and old medication. ONLY take the dose prescribed today.             Where to Get Your Medications      You can get these medications from any pharmacy visit does not uncover every injury or illness.  If you have been referred to a primary care or a specialist physician for a follow-up visit, please tell this physician (or your personal doctor if your instructions are to return to your personal doctor) abo Gregg Cabezas [address] ([address]) [phone]                Additional Information       We are concerned for your overall well being:    - If you are a smoker or have smoked in the last 12 months, we encourage you to explore options for [facility]